# Patient Record
Sex: MALE | Race: AMERICAN INDIAN OR ALASKA NATIVE | NOT HISPANIC OR LATINO | Employment: OTHER | URBAN - METROPOLITAN AREA
[De-identification: names, ages, dates, MRNs, and addresses within clinical notes are randomized per-mention and may not be internally consistent; named-entity substitution may affect disease eponyms.]

---

## 2017-01-10 ENCOUNTER — OFFICE VISIT (OUTPATIENT)
Dept: PODIATRY | Facility: CLINIC | Age: 67
End: 2017-01-10
Payer: MEDICARE

## 2017-01-10 VITALS
HEIGHT: 78 IN | DIASTOLIC BLOOD PRESSURE: 82 MMHG | HEART RATE: 59 BPM | WEIGHT: 315 LBS | SYSTOLIC BLOOD PRESSURE: 167 MMHG | BODY MASS INDEX: 36.45 KG/M2

## 2017-01-10 DIAGNOSIS — B35.1 ONYCHOMYCOSIS: ICD-10-CM

## 2017-01-10 DIAGNOSIS — E11.42 TYPE 2 DIABETES MELLITUS WITH PERIPHERAL NEUROPATHY: Primary | ICD-10-CM

## 2017-01-10 DIAGNOSIS — B35.3 TINEA PEDIS OF BOTH FEET: ICD-10-CM

## 2017-01-10 DIAGNOSIS — I87.2 VENOUS INSUFFICIENCY OF BOTH LOWER EXTREMITIES: ICD-10-CM

## 2017-01-10 PROCEDURE — 4010F ACE/ARB THERAPY RXD/TAKEN: CPT | Mod: S$GLB,,, | Performed by: PODIATRIST

## 2017-01-10 PROCEDURE — 11721 DEBRIDE NAIL 6 OR MORE: CPT | Mod: Q9,S$GLB,, | Performed by: PODIATRIST

## 2017-01-10 PROCEDURE — 99212 OFFICE O/P EST SF 10 MIN: CPT | Mod: 25,S$GLB,, | Performed by: PODIATRIST

## 2017-01-10 PROCEDURE — 1159F MED LIST DOCD IN RCRD: CPT | Mod: S$GLB,,, | Performed by: PODIATRIST

## 2017-01-10 PROCEDURE — 3079F DIAST BP 80-89 MM HG: CPT | Mod: S$GLB,,, | Performed by: PODIATRIST

## 2017-01-10 PROCEDURE — 99999 PR PBB SHADOW E&M-EST. PATIENT-LVL III: CPT | Mod: PBBFAC,,, | Performed by: PODIATRIST

## 2017-01-10 PROCEDURE — 3077F SYST BP >= 140 MM HG: CPT | Mod: S$GLB,,, | Performed by: PODIATRIST

## 2017-01-10 PROCEDURE — 3045F PR MOST RECENT HEMOGLOBIN A1C LEVEL 7.0-9.0%: CPT | Mod: S$GLB,,, | Performed by: PODIATRIST

## 2017-01-10 PROCEDURE — 2022F DILAT RTA XM EVC RTNOPTHY: CPT | Mod: S$GLB,,, | Performed by: PODIATRIST

## 2017-01-10 RX ORDER — KETOCONAZOLE 20 MG/G
CREAM TOPICAL 2 TIMES DAILY
Qty: 60 G | Refills: 5 | Status: SHIPPED | OUTPATIENT
Start: 2017-01-10 | End: 2017-07-29 | Stop reason: SDUPTHER

## 2017-01-10 NOTE — MR AVS SNAPSHOT
[facility]   [place]  [place] [address]  Phone: [phone]                  Titi Banks   1/10/2017 10:15 AM   Office Visit    Description:  Male : 1950   Provider:  Bert Lazar DPM   Department:  [facility]           Reason for Visit     Diabetic Foot Exam           Diagnoses this Visit        Comments    Type 2 diabetes mellitus with peripheral neuropathy    -  Primary     Venous insufficiency of both lower extremities         Onychomycosis         Tinea pedis of both feet                To Do List           Future Appointments        Provider Department Dept Phone    2017 9:00 AM [facility] [phone]    4/10/2017 10:00 AM [facility] [phone]      Goals (5 Years of Data)     None      Follow-Up and Disposition     Return in about 3 months (around 4/10/2017).       These Medications        Disp Refills Start End    ketoconazole (NIZORAL) 2 % cream 60 g 5 1/10/2017     Apply topically 2 (two) times daily. Apply to feet. - Topical (Top)    Pharmacy: [facility]/pharmacy #[id number] - [place] - [address]  #: [phone]         [facility]     [facility] Care Line -  Assistance  Registered nurses in the [facility] provide clinical advisement, health education, appointment booking, and other advisory services.  Call for this free service at [phone].             Medications           START taking these NEW medications        Refills    ketoconazole (NIZORAL) 2 % cream 5    Sig: Apply topically 2 (two) times daily. Apply to feet.    Class: Normal    Route: Topical (Top)           Verify that the below list of medications is an accurate representation of the medications you are currently taking.  If none reported, the list may be blank. If incorrect, please contact your healthcare provider. Carry this list with you in case of emergency.          "  Current Medications     b complex vitamins tablet Take 1 tablet by mouth once daily.    CA CARB/D3/MAG OX//KAYLENE/ZN (CALTRATE + D3 PLUS MINERALS ORAL) Take 1 tablet by mouth once daily.    glimepiride (AMARYL) 2 MG tablet Take 2 mg by mouth once daily.     glucosamine-chondroitin 500-400 mg tablet Take 1 tablet by mouth once daily.    Lactobacillus rhamnosus GG (CULTURELLE) 10 billion cell capsule Take 1 capsule by mouth once daily.    losartan (COZAAR) 25 MG tablet Take 25 mg by mouth once daily.     metformin (GLUCOPHAGE-XR) 500 MG 24 hr tablet Pt. May Take 2 tablets by mouth in the morning, and 2 tablets by mouth in the evening or pt. Can take one tablet by mouth four times a day.    multivitamin capsule Take 1 capsule by mouth once daily.    multivitamin-minerals-lutein Tab Take 1 tablet by mouth once daily.    pravastatin (PRAVACHOL) 40 MG tablet Take 40 mg by mouth every evening.     ketoconazole (NIZORAL) 2 % cream Apply topically 2 (two) times daily. Apply to feet.           Clinical Reference Information           Vital Signs - Last Recorded  Most recent update: 1/10/2017 11:13 AM by Reena Mack MA    BP Pulse Ht Wt BMI    (!) 167/82 (!) 59 6' 9" (2.057 m) (!) 213.2 kg (470 lb) 50.37 kg/m2      Blood Pressure          Most Recent Value    BP  (!)  167/82      Allergies as of 1/10/2017     Aspartame    Avandia [Rosiglitazone]      Immunizations Administered on Date of Encounter - 1/10/2017     None      Orders Placed During Today's Visit     Future Labs/Procedures Expected by Expires    US Lower Extremity Arteries Bilateral  1/10/2017 1/10/2018      "

## 2017-01-11 NOTE — PROGRESS NOTES
"Subjective:      Patient ID: Titi Banks is a 66 y.o. male.    Chief Complaint: Diabetic Foot Exam    Titi is a 66 y.o. male who presents to the clinic for evaluation and treatment of high risk feet. Titi has a past medical history of Arthritis; Cataract; Diabetes mellitus, type 2; Hyperlipidemia; Hypertension; PAD (peripheral artery disease); and Seizures. Complains of intermittent itching to both feet.    PCP: JUDY Jennings  Date Last Seen by PCP:     Current shoe gear:  Affected Foot: Casual shoes     Unaffected Foot: Casual shoes    Hemoglobin A1C   Date Value Ref Range Status   02/12/2016 7.2 (H) 4.5 - 6.2 % Final   02/12/2016 7.2 (H) 4.5 - 6.2 % Final     Vitals:    01/10/17 1112   BP: (!) 167/82   Pulse: (!) 59   Weight: (!) 213.2 kg (470 lb)   Height: 6' 9" (2.057 m)   PainSc: 0-No pain      Past Medical History   Diagnosis Date    Arthritis     Cataract     Diabetes mellitus, type 2     Hyperlipidemia     Hypertension     PAD (peripheral artery disease)     Seizures        Past Surgical History   Procedure Laterality Date    Knee arthroscopy 10+ yrs ago Left 10+ years ago    Eye surgery         Family History   Problem Relation Age of Onset    Hypertension Mother     Heart disease Mother     Cancer Father     Heart disease Sister     Diabetes Brother     Hypertension Brother     Heart disease Brother        Social History     Social History    Marital status:      Spouse name: N/A    Number of children: N/A    Years of education: N/A     Social History Main Topics    Smoking status: Never Smoker    Smokeless tobacco: None    Alcohol use No    Drug use: No    Sexual activity: Not Asked     Other Topics Concern    None     Social History Narrative       Current Outpatient Prescriptions   Medication Sig Dispense Refill    b complex vitamins tablet Take 1 tablet by mouth once daily.      CA CARB/D3/MAG OX//KAYLENE/ZN (CALTRATE + D3 PLUS MINERALS ORAL) Take 1 tablet " by mouth once daily.      glimepiride (AMARYL) 2 MG tablet Take 2 mg by mouth once daily.       glucosamine-chondroitin 500-400 mg tablet Take 1 tablet by mouth once daily.      Lactobacillus rhamnosus GG (CULTURELLE) 10 billion cell capsule Take 1 capsule by mouth once daily.      losartan (COZAAR) 25 MG tablet Take 25 mg by mouth once daily.       metformin (GLUCOPHAGE-XR) 500 MG 24 hr tablet Pt. May Take 2 tablets by mouth in the morning, and 2 tablets by mouth in the evening or pt. Can take one tablet by mouth four times a day.      multivitamin capsule Take 1 capsule by mouth once daily.      multivitamin-minerals-lutein Tab Take 1 tablet by mouth once daily.      pravastatin (PRAVACHOL) 40 MG tablet Take 40 mg by mouth every evening.       ketoconazole (NIZORAL) 2 % cream Apply topically 2 (two) times daily. Apply to feet. 60 g 5     No current facility-administered medications for this visit.        Review of patient's allergies indicates:   Allergen Reactions    Aspartame     Avandia [rosiglitazone]        Review of Systems   Constitution: Negative for chills and fever.   Cardiovascular: Positive for leg swelling. Negative for chest pain and claudication.   Respiratory: Negative for cough and shortness of breath.    Skin: Positive for dry skin, nail changes and poor wound healing.   Musculoskeletal: Negative.    Gastrointestinal: Negative for nausea and vomiting.   Neurological: Positive for numbness. Negative for paresthesias.   Psychiatric/Behavioral: Negative for altered mental status.           Objective:      Physical Exam   Constitutional: He is oriented to person, place, and time. No distress.   HENT:   Head: Normocephalic.   Cardiovascular: Intact distal pulses.    Pulses:       Dorsalis pedis pulses are 2+ on the right side, and 2+ on the left side.        Posterior tibial pulses are 1+ on the right side, and 1+ on the left side.   CFT< 3 secs all toes bilateral foot, skin temp warm  bilateral foot, no digital hair growth bilateral foot, moderate pitting lower extremity edema with hemosiderin staining and rubor on dependency bilateral.         Musculoskeletal:   Equinus noted b/l ankles. Hammertoe contractures noted to toes 2-3 b/l, semi-reducible. No pain to palpation b/l foot or toes. Cavus foot type bilateral.     Feet:   Right Foot:   Protective Sensation: 10 sites tested. 0 sites sensed.   Skin Integrity: Negative for ulcer, blister, skin breakdown, erythema, warmth, callus or dry skin.   Left Foot:   Protective Sensation: 10 sites tested. 0 sites sensed.   Skin Integrity: Positive for callus. Negative for ulcer (distal tip 3rd toe), blister (3rd toe) or erythema (3rd toe).   Neurological: He is alert and oriented to person, place, and time. He has normal strength. A sensory deficit is present.   Light touch, proprioception, and sharp/dull sensation are all intact bilaterally. Diminished vibratory sensation b/l.    Skin: Skin is warm, dry and intact. No ecchymosis and no rash noted. He is not diaphoretic. No cyanosis or erythema. Nails show no clubbing.   Nails 1-5 bilateral are elongated, dystrophic, brittle with moderate debris and yellow discoloration.     Skin is dry and scaly involving plantar foot and interdigital areas bilateral foot.    No open lesions or macerations bilateral lower extremity.           Vitals reviewed.        Assessment:       Encounter Diagnoses   Name Primary?    Type 2 diabetes mellitus with peripheral neuropathy Yes    Venous insufficiency of both lower extremities     Onychomycosis     Tinea pedis of both feet          Plan:       Titi was seen today for diabetic foot exam.    Diagnoses and all orders for this visit:    Type 2 diabetes mellitus with peripheral neuropathy  -     US Lower Extremity Arteries Bilateral; Future  -     ketoconazole (NIZORAL) 2 % cream; Apply topically 2 (two) times daily. Apply to feet.    Venous insufficiency of both lower  extremities  -     US Lower Extremity Arteries Bilateral; Future  -     ketoconazole (NIZORAL) 2 % cream; Apply topically 2 (two) times daily. Apply to feet.    Onychomycosis    Tinea pedis of both feet      I counseled the patient on his conditions, their implications and medical management.    Discussed importance of tight glycemic control, daily foot checks, routine emolient therapy and wearing appropriate foot wear.    With patient's verbal consent, nails were aggressively reduced and debrided x 10 to their soft tissue attachment mechanically and with electric , removing all offending nail and debris. Patient relates relief following the procedure. No anesthesia or hemostasis required. No blood loss.     Discussed treating contact surfaces and shoes with Lysol and exposing to direct sun light.    Discussed proper foot and hand hygiene.    RTC 3 months or prn.

## 2017-02-04 ENCOUNTER — HOSPITAL ENCOUNTER (OUTPATIENT)
Dept: RADIOLOGY | Facility: HOSPITAL | Age: 67
Discharge: HOME | End: 2017-02-04
Attending: PODIATRIST
Payer: MEDICARE

## 2017-02-04 DIAGNOSIS — E11.42 TYPE 2 DIABETES MELLITUS WITH PERIPHERAL NEUROPATHY: ICD-10-CM

## 2017-02-04 DIAGNOSIS — I87.2 VENOUS INSUFFICIENCY OF BOTH LOWER EXTREMITIES: ICD-10-CM

## 2017-02-04 PROCEDURE — 93925 LOWER EXTREMITY STUDY: CPT | Mod: TC

## 2017-02-04 PROCEDURE — 93925 LOWER EXTREMITY STUDY: CPT | Mod: 26,,, | Performed by: RADIOLOGY

## 2017-02-13 ENCOUNTER — TELEPHONE (OUTPATIENT)
Dept: PODIATRY | Facility: CLINIC | Age: 67
End: 2017-02-13

## 2017-02-13 ENCOUNTER — PATIENT MESSAGE (OUTPATIENT)
Dept: PODIATRY | Facility: CLINIC | Age: 67
End: 2017-02-13

## 2017-02-13 DIAGNOSIS — E11.51 TYPE 2 DIABETES MELLITUS WITH PERIPHERAL CIRCULATORY DISORDER: Primary | ICD-10-CM

## 2017-02-13 NOTE — TELEPHONE ENCOUNTER
Please notify patient that he has 2 arteries going down to his left foot that have restricted blood flow. I am referring him to Cardiology at [facility] for further evaluation.

## 2017-03-03 ENCOUNTER — OFFICE VISIT (OUTPATIENT)
Dept: CARDIOLOGY | Facility: CLINIC | Age: 67
End: 2017-03-03
Payer: MEDICARE

## 2017-03-03 VITALS
HEART RATE: 90 BPM | BODY MASS INDEX: 42.66 KG/M2 | WEIGHT: 315 LBS | HEIGHT: 72 IN | SYSTOLIC BLOOD PRESSURE: 170 MMHG | DIASTOLIC BLOOD PRESSURE: 90 MMHG

## 2017-03-03 DIAGNOSIS — I73.9 PAD (PERIPHERAL ARTERY DISEASE): Primary | ICD-10-CM

## 2017-03-03 DIAGNOSIS — G47.33 OSA ON CPAP: ICD-10-CM

## 2017-03-03 DIAGNOSIS — M86.9 OSTEOMYELITIS OF FOOT, UNSPECIFIED CHRONICITY, UNSPECIFIED LATERALITY: ICD-10-CM

## 2017-03-03 DIAGNOSIS — E66.01 MORBID OBESITY, UNSPECIFIED OBESITY TYPE: Chronic | ICD-10-CM

## 2017-03-03 DIAGNOSIS — E78.5 HYPERLIPIDEMIA, UNSPECIFIED HYPERLIPIDEMIA TYPE: ICD-10-CM

## 2017-03-03 DIAGNOSIS — I10 ESSENTIAL HYPERTENSION: Chronic | ICD-10-CM

## 2017-03-03 PROCEDURE — 1160F RVW MEDS BY RX/DR IN RCRD: CPT | Mod: S$GLB,,, | Performed by: INTERNAL MEDICINE

## 2017-03-03 PROCEDURE — 3080F DIAST BP >= 90 MM HG: CPT | Mod: S$GLB,,, | Performed by: INTERNAL MEDICINE

## 2017-03-03 PROCEDURE — 99999 PR PBB SHADOW E&M-EST. PATIENT-LVL III: CPT | Mod: PBBFAC,,, | Performed by: INTERNAL MEDICINE

## 2017-03-03 PROCEDURE — 99204 OFFICE O/P NEW MOD 45 MIN: CPT | Mod: S$GLB,,, | Performed by: INTERNAL MEDICINE

## 2017-03-03 PROCEDURE — 3077F SYST BP >= 140 MM HG: CPT | Mod: S$GLB,,, | Performed by: INTERNAL MEDICINE

## 2017-03-03 NOTE — LETTER
March 3, 2017      Bert Lazar, DPM  [address] [address]           [facility]  [address], [address]  [facility] [address]  Phone: [phone]          Patient: Titi Banks   MR Number: [id number]   YOB: 1950   Date of Visit: 3/3/2017       Dear Dr. Bert Lazar:    Thank you for referring Titi Banks to me for evaluation. Attached you will find relevant portions of my assessment and plan of care.    If you have questions, please do not hesitate to call me. I look forward to following Titi Banks along with you.    Sincerely,    Jasen Manuel MD    Enclosure  CC:  No Recipients    If you would like to receive this communication electronically, please contact [email or web address] or [phone] to request more information on [facility] Link access.    For providers and/or their staff who would like to refer a patient to [facility], please contact us through our one-stop-shop provider referral line, [facility] , at [phone].    If you feel you have received this communication in error or would no longer like to receive these types of communications, please e-mail [email or web address]

## 2017-03-04 NOTE — PROGRESS NOTES
"Subjective:    Patient ID:  Titi Banks is a 66 y.o. male who presents for evaluation of Peripheral Arterial Disease      HPI  65 y/o male referred by Dr. Lazar for evaluation/management of PAD. He has a hx of morbid obesity (470 lbs), HTN, HLD, DM, NYASIA on CPAP. Had prolonged healing of left second toe which has healed appropriately. Had LE arterial doppler which suggests significant stenosis of LAT and LPT. No non healing ulceration or suspicious lesions noted. It is hard to determine if he has claudication since he doesn't walk much. He states that his right thigh sometimes "gets tired" when he walks, but mostly both legs "get weak" when he walks. Denies CP, orthopnea, PND, syncope, palps. Intermittent VELEZ. Does not smoke, but does have a family hx of CAD.     Review of Systems   Constitution: Positive for weakness and malaise/fatigue.   HENT: Negative for congestion and headaches.    Eyes: Negative for blurred vision.   Cardiovascular: Positive for dyspnea on exertion and leg swelling. Negative for chest pain, claudication, cyanosis, irregular heartbeat, near-syncope, orthopnea, palpitations, paroxysmal nocturnal dyspnea and syncope.   Respiratory: Negative for shortness of breath.    Endocrine: Negative for polyuria.   Hematologic/Lymphatic: Negative for bleeding problem.   Skin: Positive for color change, dry skin and poor wound healing. Negative for itching and rash.   Musculoskeletal: Positive for back pain, joint swelling and muscle weakness. Negative for muscle cramps.   Gastrointestinal: Negative for abdominal pain, hematemesis, hematochezia, melena, nausea and vomiting.   Genitourinary: Negative for dysuria and hematuria.   Neurological: Negative for dizziness, focal weakness, light-headedness and loss of balance.   Psychiatric/Behavioral: Negative for depression. The patient is not nervous/anxious.         Objective:    Physical Exam   Constitutional: He is oriented to person, place, and time. He " appears well-developed and well-nourished.   obese   HENT:   Head: Normocephalic and atraumatic.   Neck: Neck supple. No JVD present.   Cardiovascular: Normal rate, regular rhythm and normal heart sounds.    Pulses:       Carotid pulses are 2+ on the right side, and 2+ on the left side.       Radial pulses are 2+ on the right side, and 2+ on the left side.        Femoral pulses are 2+ on the right side, and 2+ on the left side.       Dorsalis pedis pulses are 2+ on the right side, and 1+ on the left side.        Posterior tibial pulses are 2+ on the right side, and 1+ on the left side.   LPT and LDP with strong biphasic doppler.  RPT and RDP with strong triphasic doppler.   Pulmonary/Chest: Effort normal and breath sounds normal.   Abdominal: Soft. Bowel sounds are normal.   Musculoskeletal: He exhibits no edema.   Neurological: He is alert and oriented to person, place, and time.   Skin: Skin is warm and dry.   Feet with toe erythema.  Left 2nd toe with almost completely healed lesion.   Psychiatric: He has a normal mood and affect. His behavior is normal. Thought content normal.         Assessment:       1. PAD (peripheral artery disease)    2. Essential hypertension    3. Osteomyelitis of foot, unspecified chronicity, unspecified laterality    4. Morbid obesity, unspecified obesity type    5. Hyperlipidemia, unspecified hyperlipidemia type      67 y/o male with hx and presentation as above. Arterial LE doppler reviewed and patient examined. Strong distal pulses bilaterally and left toe ulceration healing well. Will add ASA 81 mg to regimen and already on statin. Medical management of PAD for now as no evidence of CLI or non healing ulceration. Needs to lose weight, we discussed this along with heart healthy diet, med compliance and regular exercise. Currently too heavy for cath lab table for peripheral angiogram and we discussed the need for weight loss for overall health. We discussed this and that if he were to  develop symptoms of claudication, would continue medical therapy with cilostazol. Was counselled to initiate walking program. Bilateral arterial doppler prior to next visit.       Plan:       -Start ASA 81 mg daily and continue statin  -If claudication develops - cilostazol   -Weight loss  -Walking program   -f/u in 6 months with arterial LE doppler

## 2017-03-06 ENCOUNTER — TELEPHONE (OUTPATIENT)
Dept: CARDIOLOGY | Facility: CLINIC | Age: 67
End: 2017-03-06

## 2017-03-06 NOTE — TELEPHONE ENCOUNTER
----- Message from Jasen Manuel MD sent at 3/3/2017  8:01 PM CST -----  Please make sure Mr Banks gets an arterial LE doppler right before his next clinic visit in 6 months, not sooner  Thanks  ROBERT

## 2017-04-10 ENCOUNTER — OFFICE VISIT (OUTPATIENT)
Dept: PODIATRY | Facility: CLINIC | Age: 67
End: 2017-04-10
Payer: MEDICARE

## 2017-04-10 VITALS
WEIGHT: 315 LBS | HEIGHT: 72 IN | SYSTOLIC BLOOD PRESSURE: 137 MMHG | DIASTOLIC BLOOD PRESSURE: 79 MMHG | HEART RATE: 85 BPM | BODY MASS INDEX: 42.66 KG/M2

## 2017-04-10 DIAGNOSIS — E11.42 TYPE 2 DIABETES MELLITUS WITH PERIPHERAL NEUROPATHY: Primary | ICD-10-CM

## 2017-04-10 DIAGNOSIS — B35.1 ONYCHOMYCOSIS: ICD-10-CM

## 2017-04-10 DIAGNOSIS — I87.2 VENOUS INSUFFICIENCY OF BOTH LOWER EXTREMITIES: ICD-10-CM

## 2017-04-10 PROCEDURE — 99999 PR PBB SHADOW E&M-EST. PATIENT-LVL III: CPT | Mod: PBBFAC,,, | Performed by: PODIATRIST

## 2017-04-10 PROCEDURE — 99499 UNLISTED E&M SERVICE: CPT | Mod: S$GLB,,, | Performed by: PODIATRIST

## 2017-04-10 PROCEDURE — 11721 DEBRIDE NAIL 6 OR MORE: CPT | Mod: Q9,S$GLB,, | Performed by: PODIATRIST

## 2017-04-10 RX ORDER — ASPIRIN 81 MG/1
81 TABLET ORAL DAILY
COMMUNITY

## 2017-04-10 NOTE — PROGRESS NOTES
Subjective:      Patient ID: Titi Banks is a 66 y.o. male.    Chief Complaint: Diabetic Foot Exam    Titi is a 66 y.o. male who presents to the clinic for evaluation and treatment of high risk feet. Titi has a past medical history of Arthritis; Cataract; Diabetes mellitus, type 2; Hyperlipidemia; Hypertension; PAD (peripheral artery disease); and Seizures. No new complaints.    PCP: JUDY Jennings  Date Last Seen by PCP: 6 months ago    Current shoe gear:  Affected Foot: Casual shoes     Unaffected Foot: Casual shoes    Hemoglobin A1C   Date Value Ref Range Status   02/12/2016 7.2 (H) 4.5 - 6.2 % Final   02/12/2016 7.2 (H) 4.5 - 6.2 % Final     Vitals:    04/10/17 1004   BP: 137/79   Pulse: 85   Weight: (!) 213.2 kg (470 lb)   Height: 6' (1.829 m)   PainSc: 0-No pain      Past Medical History:   Diagnosis Date    Arthritis     Cataract     Diabetes mellitus, type 2     Hyperlipidemia     Hypertension     PAD (peripheral artery disease)     Seizures        Past Surgical History:   Procedure Laterality Date    EYE SURGERY      knee arthroscopy 10+ yrs ago Left 10+ years ago       Family History   Problem Relation Age of Onset    Hypertension Mother     Heart disease Mother     Cancer Father     Heart disease Sister     Diabetes Brother     Hypertension Brother     Heart disease Brother        Social History     Social History    Marital status:      Spouse name: N/A    Number of children: N/A    Years of education: N/A     Social History Main Topics    Smoking status: Never Smoker    Smokeless tobacco: None    Alcohol use No    Drug use: No    Sexual activity: Not Asked     Other Topics Concern    None     Social History Narrative       Current Outpatient Prescriptions   Medication Sig Dispense Refill    aspirin (ECOTRIN) 81 MG EC tablet Take 81 mg by mouth once daily.      b complex vitamins tablet Take 1 tablet by mouth once daily.      CA CARB/D3/MAG OX//KAYLENE/ZN  (CALTRATE + D3 PLUS MINERALS ORAL) Take 1 tablet by mouth once daily.      glimepiride (AMARYL) 2 MG tablet Take 2 mg by mouth once daily.       glucosamine-chondroitin 500-400 mg tablet Take 1 tablet by mouth once daily.      ketoconazole (NIZORAL) 2 % cream Apply topically 2 (two) times daily. Apply to feet. 60 g 5    Lactobacillus rhamnosus GG (CULTURELLE) 10 billion cell capsule Take 1 capsule by mouth once daily.      losartan (COZAAR) 25 MG tablet Take 25 mg by mouth once daily.       metformin (GLUCOPHAGE-XR) 500 MG 24 hr tablet Pt. May Take 2 tablets by mouth in the morning, and 2 tablets by mouth in the evening or pt. Can take one tablet by mouth four times a day.      multivitamin capsule Take 1 capsule by mouth once daily.      multivitamin-minerals-lutein Tab Take 1 tablet by mouth once daily.      pravastatin (PRAVACHOL) 40 MG tablet Take 40 mg by mouth every evening.        No current facility-administered medications for this visit.        Review of patient's allergies indicates:   Allergen Reactions    Aspartame     Avandia [rosiglitazone]        Review of Systems   Constitution: Negative for chills and fever.   Cardiovascular: Positive for leg swelling. Negative for chest pain and claudication.   Respiratory: Negative for cough and shortness of breath.    Skin: Positive for dry skin, nail changes and poor wound healing.   Musculoskeletal: Negative.    Gastrointestinal: Negative for nausea and vomiting.   Neurological: Positive for numbness. Negative for paresthesias.   Psychiatric/Behavioral: Negative for altered mental status.           Objective:      Physical Exam   Constitutional: He is oriented to person, place, and time. No distress.   HENT:   Head: Normocephalic.   Cardiovascular: Intact distal pulses.    Pulses:       Dorsalis pedis pulses are 2+ on the right side, and 2+ on the left side.        Posterior tibial pulses are 1+ on the right side, and 1+ on the left side.   CFT< 3  secs all toes bilateral foot, skin temp warm bilateral foot, no digital hair growth bilateral foot, moderate pitting lower extremity edema with hemosiderin staining and rubor on dependency bilateral.         Musculoskeletal:   Equinus noted b/l ankles. Hammertoe contractures noted to toes 2-3 b/l, semi-reducible. No pain to palpation b/l foot or toes. Cavus foot type bilateral.     Feet:   Right Foot:   Protective Sensation: 10 sites tested. 0 sites sensed.   Skin Integrity: Negative for ulcer, blister, skin breakdown, erythema, warmth, callus or dry skin.   Left Foot:   Protective Sensation: 10 sites tested. 0 sites sensed.   Skin Integrity: Positive for callus. Negative for ulcer (distal tip 3rd toe), blister (3rd toe) or erythema (3rd toe).   Neurological: He is alert and oriented to person, place, and time. He has normal strength. A sensory deficit is present.   Light touch, proprioception, and sharp/dull sensation are all intact bilaterally. Diminished vibratory sensation b/l.    Skin: Skin is warm, dry and intact. No ecchymosis and no rash noted. He is not diaphoretic. No cyanosis or erythema. Nails show no clubbing.   Nails 1-5 bilateral are elongated, dystrophic, brittle with moderate debris and yellow discoloration.     Skin is dry bilateral foot.    No open lesions or macerations bilateral lower extremity.           Vitals reviewed.        Assessment:       Encounter Diagnoses   Name Primary?    Type 2 diabetes mellitus with peripheral neuropathy Yes    Venous insufficiency of both lower extremities     Onychomycosis          Plan:       Titi was seen today for diabetic foot exam.    Diagnoses and all orders for this visit:    Type 2 diabetes mellitus with peripheral neuropathy    Venous insufficiency of both lower extremities    Onychomycosis    I counseled the patient on his conditions, their implications and medical management.    Discussed importance of tight glycemic control, daily foot checks,  routine emolient therapy and wearing appropriate foot wear.    With patient's verbal consent, nails were aggressively reduced and debrided x 10 to their soft tissue attachment mechanically and with electric , removing all offending nail and debris. Patient relates relief following the procedure. No anesthesia or hemostasis required. No blood loss.     RTC 3 months or prn.

## 2017-04-10 NOTE — MR AVS SNAPSHOT
[facility]   [place] [address]  Phone: [phone]                  Titi Banks   4/10/2017 10:00 AM   Office Visit    Description:  Male : 1950   Provider:  Bert Lazar DPM   Department:  [facility]           Reason for Visit     Diabetic Foot Exam                To Do List           Future Appointments        Provider Department Dept Phone    2017 9:45 AM Bert Lazar DPM [facility] [phone]      Goals (5 Years of Data)     None      Follow-Up and Disposition     Return in about 3 months (around 7/10/2017).      [facility]     [facility] On Call Nurse Care Line -  Assistance  Unless otherwise directed by your provider, please contact [facility], our nurse care line that is available for  assistance.     Registered nurses in the [facility] On Call Center provide: appointment scheduling, clinical advisement, health education, and other advisory services.  Call: [phone] (toll free)               Medications                Verify that the below list of medications is an accurate representation of the medications you are currently taking.  If none reported, the list may be blank. If incorrect, please contact your healthcare provider. Carry this list with you in case of emergency.           Current Medications     aspirin (ECOTRIN) 81 MG EC tablet Take 81 mg by mouth once daily.    b complex vitamins tablet Take 1 tablet by mouth once daily.    CA CARB/D3/MAG OX//KAYLENE/ZN (CALTRATE + D3 PLUS MINERALS ORAL) Take 1 tablet by mouth once daily.    glimepiride (AMARYL) 2 MG tablet Take 2 mg by mouth once daily.     glucosamine-chondroitin 500-400 mg tablet Take 1 tablet by mouth once daily.    ketoconazole (NIZORAL) 2 % cream Apply topically 2 (two) times daily. Apply to feet.    Lactobacillus rhamnosus GG (CULTURELLE) 10 billion cell capsule Take 1 capsule by mouth once daily.    losartan (COZAAR) 25 MG tablet  Take 25 mg by mouth once daily.     metformin (GLUCOPHAGE-XR) 500 MG 24 hr tablet Pt. May Take 2 tablets by mouth in the morning, and 2 tablets by mouth in the evening or pt. Can take one tablet by mouth four times a day.    multivitamin capsule Take 1 capsule by mouth once daily.    multivitamin-minerals-lutein Tab Take 1 tablet by mouth once daily.    pravastatin (PRAVACHOL) 40 MG tablet Take 40 mg by mouth every evening.            Clinical Reference Information           Your Vitals Were     BP Pulse Height Weight BMI    137/79 85 6' (1.829 m) 213.2 kg (470 lb) 63.74 kg/m2      Blood Pressure          Most Recent Value    BP  137/79      Allergies as of 4/10/2017     Aspartame    Avandia [Rosiglitazone]      Immunizations Administered on Date of Encounter - 4/10/2017     None      Language Assistance Services     ATTENTION: Language assistance services are available, free of charge. Please call [phone].      ATENCIÓN: Si habla español, tiene a wray disposición servicios gratuitos de asistencia lingüística. Aliyah al [phone].     JOSE R Ý: N?u b?n nói Ti?ng Vi?t, có các d?ch v? h? tr? ngôn ng? mi?n phí dành cho b?n. G?i s? [phone].         [facility] complies with applicable Federal civil rights laws and does not discriminate on the basis of race, color, national origin, age, disability, or sex.

## 2017-07-11 ENCOUNTER — OFFICE VISIT (OUTPATIENT)
Dept: PODIATRY | Facility: CLINIC | Age: 67
End: 2017-07-11
Payer: MEDICARE

## 2017-07-11 VITALS
HEART RATE: 73 BPM | WEIGHT: 315 LBS | HEIGHT: 72 IN | SYSTOLIC BLOOD PRESSURE: 139 MMHG | DIASTOLIC BLOOD PRESSURE: 82 MMHG | BODY MASS INDEX: 42.66 KG/M2

## 2017-07-11 DIAGNOSIS — E11.42 TYPE 2 DIABETES MELLITUS WITH PERIPHERAL NEUROPATHY: Primary | ICD-10-CM

## 2017-07-11 DIAGNOSIS — E11.51 TYPE 2 DIABETES MELLITUS WITH PERIPHERAL CIRCULATORY DISORDER: ICD-10-CM

## 2017-07-11 DIAGNOSIS — B35.1 ONYCHOMYCOSIS: ICD-10-CM

## 2017-07-11 PROCEDURE — 99999 PR PBB SHADOW E&M-EST. PATIENT-LVL III: CPT | Mod: PBBFAC,,, | Performed by: PODIATRIST

## 2017-07-11 PROCEDURE — 99499 UNLISTED E&M SERVICE: CPT | Mod: S$GLB,,, | Performed by: PODIATRIST

## 2017-07-11 PROCEDURE — 11721 DEBRIDE NAIL 6 OR MORE: CPT | Mod: Q9,S$GLB,, | Performed by: PODIATRIST

## 2017-07-12 NOTE — PROGRESS NOTES
Subjective:      Patient ID: Titi Banks is a 67 y.o. male.    Chief Complaint: Diabetic Foot Exam (PCP 7/21/16)    Titi is a 67 y.o. male who presents to the clinic for evaluation and treatment of high risk feet. Titi has a past medical history of Arthritis; Cataract; Diabetes mellitus, type 2; Hyperlipidemia; Hypertension; PAD (peripheral artery disease); and Seizures. No new complaints.    PCP: JUDY Jennings  Date Last Seen by PCP:     Current shoe gear:  Affected Foot: Casual shoes     Unaffected Foot: Casual shoes    Hemoglobin A1C   Date Value Ref Range Status   02/12/2016 7.2 (H) 4.5 - 6.2 % Final   02/12/2016 7.2 (H) 4.5 - 6.2 % Final     Vitals:    07/11/17 1002   BP: 139/82   Pulse: 73   Weight: (!) 213.2 kg (470 lb)   Height: 6' (1.829 m)   PainSc: 0-No pain      Past Medical History:   Diagnosis Date    Arthritis     Cataract     Diabetes mellitus, type 2     Hyperlipidemia     Hypertension     PAD (peripheral artery disease)     Seizures        Past Surgical History:   Procedure Laterality Date    EYE SURGERY      knee arthroscopy 10+ yrs ago Left 10+ years ago       Family History   Problem Relation Age of Onset    Hypertension Mother     Heart disease Mother     Cancer Father     Heart disease Sister     Diabetes Brother     Hypertension Brother     Heart disease Brother        Social History     Social History    Marital status:      Spouse name: N/A    Number of children: N/A    Years of education: N/A     Social History Main Topics    Smoking status: Never Smoker    Smokeless tobacco: None    Alcohol use No    Drug use: No    Sexual activity: Not Asked     Other Topics Concern    None     Social History Narrative    None       Current Outpatient Prescriptions   Medication Sig Dispense Refill    aspirin (ECOTRIN) 81 MG EC tablet Take 81 mg by mouth once daily.      b complex vitamins tablet Take 1 tablet by mouth once daily.      CA CARB/D3/MAG  OX//KAYLENE/ZN (CALTRATE + D3 PLUS MINERALS ORAL) Take 1 tablet by mouth once daily.      glimepiride (AMARYL) 2 MG tablet Take 2 mg by mouth once daily.       glucosamine-chondroitin 500-400 mg tablet Take 1 tablet by mouth once daily.      ketoconazole (NIZORAL) 2 % cream Apply topically 2 (two) times daily. Apply to feet. 60 g 5    Lactobacillus rhamnosus GG (CULTURELLE) 10 billion cell capsule Take 1 capsule by mouth once daily.      losartan (COZAAR) 25 MG tablet Take 25 mg by mouth once daily.       metformin (GLUCOPHAGE-XR) 500 MG 24 hr tablet Pt. May Take 2 tablets by mouth in the morning, and 2 tablets by mouth in the evening or pt. Can take one tablet by mouth four times a day.      multivitamin capsule Take 1 capsule by mouth once daily.      multivitamin-minerals-lutein Tab Take 1 tablet by mouth once daily.      pravastatin (PRAVACHOL) 40 MG tablet Take 40 mg by mouth every evening.        No current facility-administered medications for this visit.        Review of patient's allergies indicates:   Allergen Reactions    Aspartame     Avandia [rosiglitazone]        Review of Systems   Constitution: Negative for chills and fever.   Cardiovascular: Positive for leg swelling. Negative for chest pain and claudication.   Respiratory: Negative for cough and shortness of breath.    Skin: Positive for dry skin, nail changes and poor wound healing.   Musculoskeletal: Negative.    Gastrointestinal: Negative for nausea and vomiting.   Neurological: Positive for numbness. Negative for paresthesias.   Psychiatric/Behavioral: Negative for altered mental status.           Objective:      Physical Exam   Constitutional: He is oriented to person, place, and time. No distress.   HENT:   Head: Normocephalic.   Cardiovascular: Intact distal pulses.    Pulses:       Dorsalis pedis pulses are 1+ on the right side, and 1+ on the left side.        Posterior tibial pulses are 1+ on the right side, and 1+ on the left  side.   CFT< 3 secs all toes bilateral foot, skin temp warm to cool bilateral foot, no digital hair growth bilateral lower extremity, moderate pitting lower extremity edema with hemosiderin staining and rubor on dependency bilateral.         Musculoskeletal:   Equinus noted b/l ankles. Hammertoe contractures noted to toes 2-3 b/l, semi-reducible. No pain to palpation b/l foot or toes. Cavus foot type bilateral.     Feet:   Right Foot:   Protective Sensation: 10 sites tested. 0 sites sensed.   Skin Integrity: Positive for dry skin. Negative for ulcer, blister, skin breakdown, erythema, warmth or callus.   Left Foot:   Protective Sensation: 10 sites tested. 0 sites sensed.   Skin Integrity: Positive for dry skin. Negative for ulcer (distal tip 3rd toe), blister (3rd toe), skin breakdown, erythema (3rd toe), warmth or callus.   Neurological: He is alert and oriented to person, place, and time. He has normal strength. A sensory deficit is present.   Diminished vibratory sensation b/l.    Skin: Skin is warm, dry and intact. Capillary refill takes 2 to 3 seconds. No ecchymosis and no rash noted. He is not diaphoretic. No cyanosis or erythema. Nails show no clubbing.   Nails 1-5 bilateral are elongated, dystrophic, brittle with moderate debris and yellow discoloration.     Skin is dry and atrophied bilateral foot.    No open lesions or macerations bilateral lower extremity.           Vitals reviewed.        Assessment:       Encounter Diagnoses   Name Primary?    Type 2 diabetes mellitus with peripheral neuropathy Yes    Type 2 diabetes mellitus with peripheral circulatory disorder     Onychomycosis          Plan:       Titi was seen today for diabetic foot exam.    Diagnoses and all orders for this visit:    Type 2 diabetes mellitus with peripheral neuropathy    Type 2 diabetes mellitus with peripheral circulatory disorder    Onychomycosis      I counseled the patient on his conditions, their implications and medical  management.    Discussed importance of tight glycemic control, daily foot checks, routine emolient therapy and wearing appropriate foot wear.    With patient's verbal consent, nails were aggressively reduced and debrided x 10 to their soft tissue attachment mechanically and with electric , removing all offending nail and debris. Patient relates relief following the procedure. No anesthesia or hemostasis required. No blood loss.     RTC 3 months or prn.

## 2017-07-29 DIAGNOSIS — E11.42 TYPE 2 DIABETES MELLITUS WITH PERIPHERAL NEUROPATHY: ICD-10-CM

## 2017-07-29 DIAGNOSIS — I87.2 VENOUS INSUFFICIENCY OF BOTH LOWER EXTREMITIES: ICD-10-CM

## 2017-07-31 RX ORDER — KETOCONAZOLE 20 MG/G
CREAM TOPICAL
Qty: 60 G | Refills: 5 | Status: SHIPPED | OUTPATIENT
Start: 2017-07-31 | End: 2018-02-26 | Stop reason: SDUPTHER

## 2017-10-07 ENCOUNTER — HOSPITAL ENCOUNTER (OUTPATIENT)
Dept: RADIOLOGY | Facility: HOSPITAL | Age: 67
Discharge: HOME | End: 2017-10-07
Attending: INTERNAL MEDICINE
Payer: MEDICARE

## 2017-10-07 DIAGNOSIS — I73.9 PAD (PERIPHERAL ARTERY DISEASE): ICD-10-CM

## 2017-10-07 PROCEDURE — 93925 LOWER EXTREMITY STUDY: CPT | Mod: 26,,, | Performed by: RADIOLOGY

## 2017-10-07 PROCEDURE — 93925 LOWER EXTREMITY STUDY: CPT | Mod: TC

## 2017-10-10 ENCOUNTER — TELEPHONE (OUTPATIENT)
Dept: CARDIOLOGY | Facility: CLINIC | Age: 67
End: 2017-10-10

## 2017-10-10 NOTE — TELEPHONE ENCOUNTER
----- Message from Jasen Manuel MD sent at 10/10/2017 10:17 AM CDT -----  Pleas let Mr Banks know that his leg ultrasound shows no evidence of severe blockages and good blood flow  Thanks  ROBERT

## 2017-10-17 ENCOUNTER — OFFICE VISIT (OUTPATIENT)
Dept: CARDIOLOGY | Facility: CLINIC | Age: 67
End: 2017-10-17
Payer: MEDICARE

## 2017-10-17 VITALS
OXYGEN SATURATION: 97 % | WEIGHT: 315 LBS | HEIGHT: 72 IN | BODY MASS INDEX: 42.66 KG/M2 | HEART RATE: 74 BPM | SYSTOLIC BLOOD PRESSURE: 136 MMHG | DIASTOLIC BLOOD PRESSURE: 79 MMHG

## 2017-10-17 DIAGNOSIS — I10 ESSENTIAL HYPERTENSION: Chronic | ICD-10-CM

## 2017-10-17 DIAGNOSIS — E11.69 DIABETES MELLITUS TYPE 2 IN OBESE: Chronic | ICD-10-CM

## 2017-10-17 DIAGNOSIS — E66.9 DIABETES MELLITUS TYPE 2 IN OBESE: Chronic | ICD-10-CM

## 2017-10-17 DIAGNOSIS — I73.9 PAD (PERIPHERAL ARTERY DISEASE): Primary | ICD-10-CM

## 2017-10-17 DIAGNOSIS — E78.5 HYPERLIPIDEMIA, UNSPECIFIED HYPERLIPIDEMIA TYPE: ICD-10-CM

## 2017-10-17 PROCEDURE — 99214 OFFICE O/P EST MOD 30 MIN: CPT | Mod: S$GLB,,, | Performed by: INTERNAL MEDICINE

## 2017-10-17 PROCEDURE — 99999 PR PBB SHADOW E&M-EST. PATIENT-LVL III: CPT | Mod: PBBFAC,,, | Performed by: INTERNAL MEDICINE

## 2017-10-17 NOTE — PROGRESS NOTES
"Subjective:    Patient ID:  Titi Banks is a 67 y.o. male who presents for follow-up of Peripheral Arterial Disease      HPI  68 y/o male with hx of PAD s/p prolonged healing of  left second toe which healed appropriately with original arterial doppler of LE's that suggested at least moderate stenosis of LPT and LAT with repeat arterial doppler with no significant stenosis, morbid obesity (455 lbs down from 470 lbs last visit), HTN, HLD, DM, NYASIA on CPAP. No non healing ulceration or suspicious lesions noted. Does not seem to have claudication symptoms, although he does not ambulate much. His "leg pains" are mostly in his knees and ankles. He states that his right thigh sometimes "gets tired" when he walks. Denies CP, orthopnea, PND, syncope, palps. Intermittent VELEZ. Does not smoke, but does have a family hx of CAD. Last clinic visit started on ASA and he is already on a statin. Compliant with meds, but does not follow a heart healthy diet.     Review of Systems   Constitution: Positive for weakness. Negative for malaise/fatigue.   HENT: Negative for congestion.    Eyes: Negative for blurred vision.   Cardiovascular: Positive for dyspnea on exertion and leg swelling. Negative for chest pain, claudication, cyanosis, irregular heartbeat, near-syncope, orthopnea, palpitations, paroxysmal nocturnal dyspnea and syncope.   Respiratory: Negative for shortness of breath.    Endocrine: Negative for polyuria.   Hematologic/Lymphatic: Negative for bleeding problem.   Skin: Negative for itching and rash.   Musculoskeletal: Positive for back pain, joint pain and joint swelling. Negative for muscle cramps and muscle weakness.   Gastrointestinal: Negative for abdominal pain, hematemesis, hematochezia, melena, nausea and vomiting.   Genitourinary: Negative for dysuria and hematuria.   Neurological: Negative for dizziness, focal weakness, headaches, light-headedness and loss of balance.   Psychiatric/Behavioral: Negative for " depression. The patient is not nervous/anxious.         Objective:    Physical Exam   Constitutional: He is oriented to person, place, and time. He appears well-developed and well-nourished.   HENT:   Head: Normocephalic and atraumatic.   Neck: Neck supple. No JVD present.   Cardiovascular: Normal rate, regular rhythm and normal heart sounds.    Pulses:       Carotid pulses are 2+ on the right side, and 2+ on the left side.       Radial pulses are 2+ on the right side, and 2+ on the left side.        Femoral pulses are 2+ on the right side, and 2+ on the left side.       Dorsalis pedis pulses are 2+ on the right side, and 2+ on the left side.        Posterior tibial pulses are 2+ on the right side, and 2+ on the left side.   Pulmonary/Chest: Effort normal and breath sounds normal.   Abdominal: Soft. Bowel sounds are normal.   Musculoskeletal: He exhibits edema.   Neurological: He is alert and oriented to person, place, and time.   Skin: Skin is warm and dry.   Psychiatric: He has a normal mood and affect. His behavior is normal. Thought content normal.         Assessment:       1. PAD (peripheral artery disease)    2. Hyperlipidemia, unspecified hyperlipidemia type    3. Essential hypertension    4. Diabetes mellitus type 2 in obese      68 y/o male with hx and presentation as above. Doing well from a PAD perspective with no signs of CLI or non healing ulceration and with Fer I symptoms. Current recommendation is for ASA/statin. He was counseled to walk regularly and continue losing weight.        Plan:       -Continue current medical management  -f/u in 1 year

## 2017-11-10 ENCOUNTER — OFFICE VISIT (OUTPATIENT)
Dept: PODIATRY | Facility: CLINIC | Age: 67
End: 2017-11-10
Payer: MEDICARE

## 2017-11-10 VITALS
SYSTOLIC BLOOD PRESSURE: 144 MMHG | WEIGHT: 315 LBS | DIASTOLIC BLOOD PRESSURE: 84 MMHG | HEART RATE: 74 BPM | HEIGHT: 72 IN | BODY MASS INDEX: 42.66 KG/M2

## 2017-11-10 DIAGNOSIS — B35.1 ONYCHOMYCOSIS: ICD-10-CM

## 2017-11-10 DIAGNOSIS — M79.672 FOOT PAIN, BILATERAL: ICD-10-CM

## 2017-11-10 DIAGNOSIS — E11.42 TYPE 2 DIABETES MELLITUS WITH PERIPHERAL NEUROPATHY: Primary | ICD-10-CM

## 2017-11-10 DIAGNOSIS — E11.51 TYPE 2 DIABETES MELLITUS WITH PERIPHERAL CIRCULATORY DISORDER: ICD-10-CM

## 2017-11-10 DIAGNOSIS — M79.671 FOOT PAIN, BILATERAL: ICD-10-CM

## 2017-11-10 PROCEDURE — 11721 DEBRIDE NAIL 6 OR MORE: CPT | Mod: Q9,S$GLB,, | Performed by: PODIATRIST

## 2017-11-10 PROCEDURE — 99999 PR PBB SHADOW E&M-EST. PATIENT-LVL III: CPT | Mod: PBBFAC,,, | Performed by: PODIATRIST

## 2017-11-10 PROCEDURE — 99213 OFFICE O/P EST LOW 20 MIN: CPT | Mod: 25,S$GLB,, | Performed by: PODIATRIST

## 2017-11-10 NOTE — PROCEDURES
"Routine Foot Care  Date/Time: 11/10/2017 9:48 AM  Performed by: GABBY SHAY  Authorized by: GABBY SHAY     Time out: Immediately prior to procedure a "time out" was called to verify the correct patient, procedure, equipment, support staff and site/side marked as required.    Consent Done?:  Yes (Verbal)  Hyperkeratotic Skin Lesions?: No      Nail Care Type:  Debride  Location(s): All  (Left 1st Toe, Left 3rd Toe, Left 2nd Toe, Left 4th Toe, Left 5th Toe, Right 1st Toe, Right 2nd Toe, Right 3rd Toe, Right 4th Toe and Right 5th Toe)  Patient tolerance:  Patient tolerated the procedure well with no immediate complications      "

## 2017-11-12 NOTE — PROGRESS NOTES
Subjective:      Patient ID: Titi Banks is a 67 y.o. male.    Chief Complaint: Follow-up (Dr. Ruiz 11/8/17); Nail Care; and Diabetes Mellitus    Titi is a 67 y.o. male who presents to the clinic for evaluation and treatment of high risk feet. Titi has a past medical history of Arthritis; Cataract; Diabetes mellitus, type 2; Hyperlipidemia; Hypertension; PAD (peripheral artery disease); and Seizures. Complains of burning pain in both feet present mostly at night when lying down. Relates no significant back pain.    PCP: Dr. Jamel Ruiz  Date Last Seen by PCP: 11/8/17    Current shoe gear:  Affected Foot: Casual shoes     Unaffected Foot: Casual shoes    Hemoglobin A1C   Date Value Ref Range Status   02/12/2016 7.2 (H) 4.5 - 6.2 % Final   02/12/2016 7.2 (H) 4.5 - 6.2 % Final     Vitals:    11/10/17 0932   BP: (!) 144/84   Pulse: 74   Weight: (!) 206.4 kg (455 lb)   Height: 6' (1.829 m)   PainSc: 0-No pain      Past Medical History:   Diagnosis Date    Arthritis     Cataract     Diabetes mellitus, type 2     Hyperlipidemia     Hypertension     PAD (peripheral artery disease)     Seizures        Past Surgical History:   Procedure Laterality Date    EYE SURGERY      knee arthroscopy 10+ yrs ago Left 10+ years ago       Family History   Problem Relation Age of Onset    Hypertension Mother     Heart disease Mother     Cancer Father     Heart disease Sister     Diabetes Brother     Hypertension Brother     Heart disease Brother        Social History     Social History    Marital status:      Spouse name: N/A    Number of children: N/A    Years of education: N/A     Social History Main Topics    Smoking status: Never Smoker    Smokeless tobacco: None    Alcohol use No    Drug use: No    Sexual activity: Not Asked     Other Topics Concern    None     Social History Narrative    None       Current Outpatient Prescriptions   Medication Sig Dispense Refill    aspirin (ECOTRIN)  81 MG EC tablet Take 81 mg by mouth once daily.      b complex vitamins tablet Take 1 tablet by mouth once daily.      CA CARB/D3/MAG OX//KAYLENE/ZN (CALTRATE + D3 PLUS MINERALS ORAL) Take 1 tablet by mouth once daily.      glimepiride (AMARYL) 2 MG tablet Take 2 mg by mouth once daily.       glucosamine-chondroitin 500-400 mg tablet Take 1 tablet by mouth once daily.      ketoconazole (NIZORAL) 2 % cream APPLY TO AFFECTED AREA ON FEET TWICE A DAY 60 g 5    Lactobacillus rhamnosus GG (CULTURELLE) 10 billion cell capsule Take 1 capsule by mouth once daily.      losartan (COZAAR) 25 MG tablet Take 25 mg by mouth once daily.       metformin (GLUCOPHAGE-XR) 500 MG 24 hr tablet Pt. May Take 2 tablets by mouth in the morning, and 2 tablets by mouth in the evening or pt. Can take one tablet by mouth four times a day.      multivitamin capsule Take 1 capsule by mouth once daily.      multivitamin-minerals-lutein Tab Take 1 tablet by mouth once daily.      pravastatin (PRAVACHOL) 40 MG tablet Take 40 mg by mouth every evening.        No current facility-administered medications for this visit.        Review of patient's allergies indicates:   Allergen Reactions    Aspartame     Avandia [rosiglitazone]        Review of Systems   Constitution: Negative for chills and fever.   Cardiovascular: Positive for leg swelling. Negative for chest pain and claudication.   Respiratory: Negative for cough and shortness of breath.    Skin: Positive for dry skin, nail changes and poor wound healing.   Musculoskeletal: Negative.    Gastrointestinal: Negative for nausea and vomiting.   Neurological: Positive for numbness. Negative for paresthesias.   Psychiatric/Behavioral: Negative for altered mental status.           Objective:      Physical Exam   Constitutional: He is oriented to person, place, and time. No distress.   HENT:   Head: Normocephalic.   Cardiovascular: Intact distal pulses.    Pulses:       Dorsalis pedis pulses  are 1+ on the right side, and 1+ on the left side.        Posterior tibial pulses are 1+ on the right side, and 1+ on the left side.   CFT< 3 secs all toes bilateral foot, skin temp warm to cool bilateral foot, no digital hair growth bilateral lower extremity, moderate pitting lower extremity edema with hemosiderin staining and rubor on dependency bilateral.         Musculoskeletal:   Equinus noted b/l ankles. Hammertoe contractures noted to toes 2-3 b/l, semi-reducible. No pain to palpation b/l foot or toes. Cavus foot type bilateral.     Feet:   Right Foot:   Protective Sensation: 10 sites tested. 0 sites sensed.   Skin Integrity: Positive for dry skin. Negative for ulcer, blister, skin breakdown, erythema, warmth or callus.   Left Foot:   Protective Sensation: 10 sites tested. 0 sites sensed.   Skin Integrity: Positive for dry skin. Negative for ulcer (distal tip 3rd toe), blister (3rd toe), skin breakdown, erythema (3rd toe), warmth or callus.   Neurological: He is alert and oriented to person, place, and time. He has normal strength. A sensory deficit is present.   Diminished vibratory sensation b/l.    Skin: Skin is warm, dry and intact. Capillary refill takes 2 to 3 seconds. No ecchymosis and no rash noted. He is not diaphoretic. No cyanosis or erythema. Nails show no clubbing.   Nails 1-5 bilateral are elongated 2-3 mm, dystrophic, brittle with moderate debris and yellow discoloration.     Skin is dry and atrophied bilateral foot.    No open lesions or macerations bilateral lower extremity.           Vitals reviewed.        Assessment:       Encounter Diagnoses   Name Primary?    Type 2 diabetes mellitus with peripheral neuropathy Yes    Type 2 diabetes mellitus with peripheral circulatory disorder     Onychomycosis     Foot pain, bilateral          Plan:       Titi was seen today for follow-up, nail care and diabetes mellitus.    Diagnoses and all orders for this visit:    Type 2 diabetes mellitus  with peripheral neuropathy  -     Foot Care    Type 2 diabetes mellitus with peripheral circulatory disorder  -     Foot Care    Onychomycosis  -     Foot Care    Foot pain, bilateral      I counseled the patient on his conditions, their implications and medical management.    Discussed importance of tight glycemic control, daily foot checks, routine emolient therapy and wearing appropriate foot wear.    Routine foot care per attached note    Prescribed compound analgesic cream from [facility] to be applied as needed.

## 2018-02-20 ENCOUNTER — OFFICE VISIT (OUTPATIENT)
Dept: PODIATRY | Facility: CLINIC | Age: 68
End: 2018-02-20
Payer: MEDICARE

## 2018-02-20 VITALS
HEART RATE: 79 BPM | SYSTOLIC BLOOD PRESSURE: 176 MMHG | DIASTOLIC BLOOD PRESSURE: 89 MMHG | HEIGHT: 72 IN | WEIGHT: 315 LBS | BODY MASS INDEX: 42.66 KG/M2

## 2018-02-20 DIAGNOSIS — E11.51 TYPE 2 DIABETES MELLITUS WITH PERIPHERAL CIRCULATORY DISORDER: ICD-10-CM

## 2018-02-20 DIAGNOSIS — B35.1 ONYCHOMYCOSIS: ICD-10-CM

## 2018-02-20 DIAGNOSIS — E11.42 TYPE 2 DIABETES MELLITUS WITH PERIPHERAL NEUROPATHY: Primary | ICD-10-CM

## 2018-02-20 DIAGNOSIS — S91.109A OPEN WOUND OF TOE, INITIAL ENCOUNTER: ICD-10-CM

## 2018-02-20 PROCEDURE — 99213 OFFICE O/P EST LOW 20 MIN: CPT | Mod: 25,S$GLB,, | Performed by: PODIATRIST

## 2018-02-20 PROCEDURE — 11721 DEBRIDE NAIL 6 OR MORE: CPT | Mod: Q9,S$GLB,, | Performed by: PODIATRIST

## 2018-02-20 PROCEDURE — 87070 CULTURE OTHR SPECIMN AEROBIC: CPT

## 2018-02-20 PROCEDURE — 99999 PR PBB SHADOW E&M-EST. PATIENT-LVL III: CPT | Mod: PBBFAC,,, | Performed by: PODIATRIST

## 2018-02-20 PROCEDURE — 3008F BODY MASS INDEX DOCD: CPT | Mod: S$GLB,,, | Performed by: PODIATRIST

## 2018-02-20 PROCEDURE — 87186 SC STD MICRODIL/AGAR DIL: CPT | Mod: 59

## 2018-02-20 PROCEDURE — 87077 CULTURE AEROBIC IDENTIFY: CPT | Mod: 59

## 2018-02-20 RX ORDER — GENTAMICIN SULFATE 1 MG/G
CREAM TOPICAL 2 TIMES DAILY
Qty: 30 G | Refills: 0 | Status: SHIPPED | OUTPATIENT
Start: 2018-02-20

## 2018-02-20 NOTE — PATIENT INSTRUCTIONS
Apply gentamicin cream to wound left great toe until healed within 2-4 weeks then resume using ketoconazole cream    Recommend Urea 40% cream bought on the internet. Apply to foot twice a day and avoid use between toes.

## 2018-02-20 NOTE — PROCEDURES
"Routine Foot Care  Date/Time: 2/20/2018 9:42 AM  Performed by: GABBY SHAY  Authorized by: GABBY SHAY     Time out: Immediately prior to procedure a "time out" was called to verify the correct patient, procedure, equipment, support staff and site/side marked as required.    Consent Done?:  Yes (Verbal)  Hyperkeratotic Skin Lesions?: No      Nail Care Type:  Debride  Location(s): All  (Left 1st Toe, Left 3rd Toe, Left 2nd Toe, Left 4th Toe, Left 5th Toe, Right 1st Toe, Right 2nd Toe, Right 3rd Toe, Right 4th Toe and Right 5th Toe)  Patient tolerance:  Patient tolerated the procedure well with no immediate complications      "

## 2018-02-21 NOTE — PROGRESS NOTES
Subjective:      Patient ID: Titi Banks is a 67 y.o. male.    Chief Complaint: Diabetes Mellitus (Dr. Ruiz 1/3/18) and Nail Care    Titi is a 67 y.o. male who presents to the clinic for evaluation and treatment of high risk feet. Titi has a past medical history of Arthritis; Cataract; Diabetes mellitus, type 2; Hyperlipidemia; Hypertension; PAD (peripheral artery disease); and Seizures. Complains of burning pain in both feet present mostly at night when lying down. Relates no significant back pain.    PCP: Dr. Jamel Ruiz  Date Last Seen by PCP: 1/3/18    Current shoe gear:  Affected Foot: Casual shoes     Unaffected Foot: Casual shoes    Hemoglobin A1C   Date Value Ref Range Status   02/12/2016 7.2 (H) 4.5 - 6.2 % Final   02/12/2016 7.2 (H) 4.5 - 6.2 % Final     Vitals:    02/20/18 0915   BP: (!) 176/89   Pulse: 79   Weight: (!) 206.4 kg (455 lb)   Height: 6' (1.829 m)   PainSc: 0-No pain      Past Medical History:   Diagnosis Date    Arthritis     Cataract     Diabetes mellitus, type 2     Hyperlipidemia     Hypertension     PAD (peripheral artery disease)     Seizures        Past Surgical History:   Procedure Laterality Date    EYE SURGERY      knee arthroscopy 10+ yrs ago Left 10+ years ago       Family History   Problem Relation Age of Onset    Hypertension Mother     Heart disease Mother     Cancer Father     Heart disease Sister     Diabetes Brother     Hypertension Brother     Heart disease Brother        Social History     Social History    Marital status:      Spouse name: N/A    Number of children: N/A    Years of education: N/A     Social History Main Topics    Smoking status: Never Smoker    Smokeless tobacco: None    Alcohol use No    Drug use: No    Sexual activity: Not Asked     Other Topics Concern    None     Social History Narrative    None       Current Outpatient Prescriptions   Medication Sig Dispense Refill    aspirin (ECOTRIN) 81 MG EC  tablet Take 81 mg by mouth once daily.      b complex vitamins tablet Take 1 tablet by mouth once daily.      CA CARB/D3/MAG OX//KAYLENE/ZN (CALTRATE + D3 PLUS MINERALS ORAL) Take 1 tablet by mouth once daily.      glimepiride (AMARYL) 2 MG tablet Take 2 mg by mouth once daily.       glucosamine-chondroitin 500-400 mg tablet Take 1 tablet by mouth once daily.      ketoconazole (NIZORAL) 2 % cream APPLY TO AFFECTED AREA ON FEET TWICE A DAY 60 g 5    Lactobacillus rhamnosus GG (CULTURELLE) 10 billion cell capsule Take 1 capsule by mouth once daily.      losartan (COZAAR) 25 MG tablet Take 25 mg by mouth once daily.       metformin (GLUCOPHAGE-XR) 500 MG 24 hr tablet Pt. May Take 2 tablets by mouth in the morning, and 2 tablets by mouth in the evening or pt. Can take one tablet by mouth four times a day.      multivitamin capsule Take 1 capsule by mouth once daily.      multivitamin-minerals-lutein Tab Take 1 tablet by mouth once daily.      pravastatin (PRAVACHOL) 40 MG tablet Take 40 mg by mouth every evening.       gentamicin (GARAMYCIN) 0.1 % cream Apply topically 2 (two) times daily. Apply to wound left great toe. 30 g 0     No current facility-administered medications for this visit.        Review of patient's allergies indicates:   Allergen Reactions    Aspartame     Avandia [rosiglitazone]        Review of Systems   Constitution: Negative for chills and fever.   Cardiovascular: Positive for leg swelling. Negative for chest pain and claudication.   Respiratory: Negative for cough and shortness of breath.    Skin: Positive for dry skin, nail changes and poor wound healing.   Musculoskeletal: Negative.    Gastrointestinal: Negative for nausea and vomiting.   Neurological: Positive for numbness. Negative for paresthesias.   Psychiatric/Behavioral: Negative for altered mental status.           Objective:      Physical Exam   Constitutional: He is oriented to person, place, and time. No distress.    HENT:   Head: Normocephalic.   Cardiovascular: Intact distal pulses.    Pulses:       Dorsalis pedis pulses are 1+ on the right side, and 1+ on the left side.        Posterior tibial pulses are 1+ on the right side, and 1+ on the left side.   CFT< 3 secs all toes bilateral foot, skin temp warm to cool bilateral foot, no digital hair growth bilateral lower extremity, moderate pitting lower extremity edema with hemosiderin staining and rubor on dependency bilateral.         Musculoskeletal:   Equinus noted b/l ankles. Hammertoe contractures noted to toes 2-3 b/l, semi-reducible. No pain to palpation b/l foot or toes. Cavus foot type bilateral.     Feet:   Right Foot:   Protective Sensation: 10 sites tested. 0 sites sensed.   Skin Integrity: Positive for dry skin. Negative for ulcer, blister, skin breakdown, erythema, warmth or callus.   Left Foot:   Protective Sensation: 10 sites tested. 0 sites sensed.   Skin Integrity: Positive for dry skin. Negative for ulcer (distal tip 3rd toe), blister (3rd toe), skin breakdown, erythema (3rd toe), warmth or callus.   Neurological: He is alert and oriented to person, place, and time. He has normal strength. A sensory deficit is present.   Diminished vibratory sensation b/l.    Skin: Skin is warm, dry and intact. Capillary refill takes 2 to 3 seconds. No ecchymosis and no rash noted. He is not diaphoretic. No cyanosis or erythema. Nails show no clubbing.   Nails 1-5 bilateral are elongated 2-3 mm, dystrophic, brittle with moderate debris and yellow discoloration.     Skin is dry and atrophied bilateral foot.    No open lesions or macerations bilateral lower extremity.           Vitals reviewed.        Assessment:       Encounter Diagnoses   Name Primary?    Type 2 diabetes mellitus with peripheral neuropathy Yes    Type 2 diabetes mellitus with peripheral circulatory disorder     Onychomycosis     Open wound of toe, initial encounter          Plan:       Titi was seen  today for diabetes mellitus and nail care.    Diagnoses and all orders for this visit:    Type 2 diabetes mellitus with peripheral neuropathy  -     Foot Care    Type 2 diabetes mellitus with peripheral circulatory disorder  -     Foot Care    Onychomycosis  -     Foot Care    Open wound of toe, initial encounter  -     Aerobic culture (Specify Source)    Other orders  -     gentamicin (GARAMYCIN) 0.1 % cream; Apply topically 2 (two) times daily. Apply to wound left great toe.      I counseled the patient on his conditions, their implications and medical management.    Discussed importance of tight glycemic control, daily foot checks, routine emolient therapy and wearing appropriate foot wear.    Routine foot care per attached note. Left hallux nail bed with superficial granular wound and green staining of surrounding skin, + odor, no active drainage, does not probe or track. Suspect pseudomonas therefore reviewed appropriate use of gentamicin cream.    Continue with compound analgesic cream from [facility] to be applied as needed.    Instructions for patient:  Apply gentamicin cream to wound left great toe until healed within 2-4 weeks then resume using ketoconazole cream    Recommend Urea 40% cream bought on the internet. Apply to foot twice a day and avoid use between toes.    RTC 3 months or prn.

## 2018-02-23 LAB
BACTERIA SPEC AEROBE CULT: NORMAL

## 2018-02-26 DIAGNOSIS — E11.42 TYPE 2 DIABETES MELLITUS WITH PERIPHERAL NEUROPATHY: ICD-10-CM

## 2018-02-26 DIAGNOSIS — I87.2 VENOUS INSUFFICIENCY OF BOTH LOWER EXTREMITIES: ICD-10-CM

## 2018-02-26 RX ORDER — KETOCONAZOLE 20 MG/G
CREAM TOPICAL
Qty: 60 G | Refills: 5 | Status: SHIPPED | OUTPATIENT
Start: 2018-02-26 | End: 2018-07-05 | Stop reason: SDUPTHER

## 2018-07-05 ENCOUNTER — OFFICE VISIT (OUTPATIENT)
Dept: PODIATRY | Facility: CLINIC | Age: 68
End: 2018-07-05
Payer: MEDICARE

## 2018-07-05 VITALS
BODY MASS INDEX: 36.45 KG/M2 | HEIGHT: 78 IN | DIASTOLIC BLOOD PRESSURE: 83 MMHG | SYSTOLIC BLOOD PRESSURE: 140 MMHG | WEIGHT: 315 LBS | HEART RATE: 66 BPM

## 2018-07-05 DIAGNOSIS — I87.2 VENOUS INSUFFICIENCY OF BOTH LOWER EXTREMITIES: ICD-10-CM

## 2018-07-05 DIAGNOSIS — B35.3 TINEA PEDIS OF BOTH FEET: ICD-10-CM

## 2018-07-05 DIAGNOSIS — E11.42 TYPE 2 DIABETES MELLITUS WITH PERIPHERAL NEUROPATHY: Primary | ICD-10-CM

## 2018-07-05 DIAGNOSIS — B35.1 ONYCHOMYCOSIS: ICD-10-CM

## 2018-07-05 DIAGNOSIS — E11.51 TYPE 2 DIABETES MELLITUS WITH PERIPHERAL CIRCULATORY DISORDER: ICD-10-CM

## 2018-07-05 PROCEDURE — 3079F DIAST BP 80-89 MM HG: CPT | Mod: CPTII,S$GLB,, | Performed by: PODIATRIST

## 2018-07-05 PROCEDURE — 3077F SYST BP >= 140 MM HG: CPT | Mod: CPTII,S$GLB,, | Performed by: PODIATRIST

## 2018-07-05 PROCEDURE — 11721 DEBRIDE NAIL 6 OR MORE: CPT | Mod: Q9,S$GLB,, | Performed by: PODIATRIST

## 2018-07-05 PROCEDURE — 99999 PR PBB SHADOW E&M-EST. PATIENT-LVL III: CPT | Mod: PBBFAC,,, | Performed by: PODIATRIST

## 2018-07-05 PROCEDURE — 99213 OFFICE O/P EST LOW 20 MIN: CPT | Mod: 25,S$GLB,, | Performed by: PODIATRIST

## 2018-07-05 RX ORDER — KETOCONAZOLE 20 MG/G
CREAM TOPICAL
Qty: 60 G | Refills: 5 | Status: SHIPPED | OUTPATIENT
Start: 2018-07-05

## 2018-07-05 NOTE — PROCEDURES
"Routine Foot Care  Date/Time: 7/5/2018 3:55 PM  Performed by: GABBY SHAY  Authorized by: GABBY SHAY     Time out: Immediately prior to procedure a "time out" was called to verify the correct patient, procedure, equipment, support staff and site/side marked as required.    Consent Done?:  Yes (Verbal)  Hyperkeratotic Skin Lesions?: No      Nail Care Type:  Debride  Location(s): All  (Left 1st Toe, Left 3rd Toe, Left 2nd Toe, Left 4th Toe, Left 5th Toe, Right 1st Toe, Right 2nd Toe, Right 3rd Toe, Right 4th Toe and Right 5th Toe)  Patient tolerance:  Patient tolerated the procedure well with no immediate complications      "

## 2018-07-05 NOTE — PATIENT INSTRUCTIONS
Use cerave lotion on the legs twice daily.    Mix urea 40% cream and the ketoconazole 2% cream 1:1 and apply to feet and toenails twice daily. Avoid use between toes.

## 2018-07-06 NOTE — PROGRESS NOTES
"Subjective:      Patient ID: Titi Banks is a 68 y.o. male.    Chief Complaint: Diabetic Foot Exam (Dr. Ruiz 4/13/18)    Titi is a 68 y.o. male who presents to the clinic for evaluation and treatment of high risk feet. Titi has a past medical history of Arthritis; Cataract; Diabetes mellitus, type 2; Hyperlipidemia; Hypertension; PAD (peripheral artery disease); and Seizures. Complains of burning pain in both feet present mostly at night when lying down. Relates no significant back pain. No new complaints. Accompanied by his wife. He is using urea 40% cream but says it leaves a thick white film.    PCP: Dr. Jamel Ruiz  Date Last Seen by PCP: 4/13/18    Current shoe gear:  Affected Foot: Casual shoes     Unaffected Foot: Casual shoes    Hemoglobin A1C   Date Value Ref Range Status   02/12/2016 7.2 (H) 4.5 - 6.2 % Final   02/12/2016 7.2 (H) 4.5 - 6.2 % Final     Vitals:    07/05/18 1521   BP: (!) 140/83   Pulse: 66   Weight: (!) 206.4 kg (455 lb)   Height: 6' 9" (2.057 m)   PainSc: 0-No pain      Past Medical History:   Diagnosis Date    Arthritis     Cataract     Diabetes mellitus, type 2     Hyperlipidemia     Hypertension     PAD (peripheral artery disease)     Seizures        Past Surgical History:   Procedure Laterality Date    EYE SURGERY      knee arthroscopy 10+ yrs ago Left 10+ years ago       Family History   Problem Relation Age of Onset    Hypertension Mother     Heart disease Mother     Cancer Father     Heart disease Sister     Diabetes Brother     Hypertension Brother     Heart disease Brother        Social History     Social History    Marital status:      Spouse name: N/A    Number of children: N/A    Years of education: N/A     Social History Main Topics    Smoking status: Never Smoker    Smokeless tobacco: None    Alcohol use No    Drug use: No    Sexual activity: Not Asked     Other Topics Concern    None     Social History Narrative    None "       Current Outpatient Prescriptions   Medication Sig Dispense Refill    aspirin (ECOTRIN) 81 MG EC tablet Take 81 mg by mouth once daily.      b complex vitamins tablet Take 1 tablet by mouth once daily.      CA CARB/D3/MAG OX//KAYLENE/ZN (CALTRATE + D3 PLUS MINERALS ORAL) Take 1 tablet by mouth once daily.      gentamicin (GARAMYCIN) 0.1 % cream Apply topically 2 (two) times daily. Apply to wound left great toe. 30 g 0    glimepiride (AMARYL) 2 MG tablet Take 2 mg by mouth once daily.       glucosamine-chondroitin 500-400 mg tablet Take 1 tablet by mouth once daily.      ketoconazole (NIZORAL) 2 % cream APPLY TO AFFECTED AREA ON FEET TWICE A DAY 60 g 5    Lactobacillus rhamnosus GG (CULTURELLE) 10 billion cell capsule Take 1 capsule by mouth once daily.      losartan (COZAAR) 25 MG tablet Take 25 mg by mouth once daily.       metformin (GLUCOPHAGE-XR) 500 MG 24 hr tablet Pt. May Take 2 tablets by mouth in the morning, and 2 tablets by mouth in the evening or pt. Can take one tablet by mouth four times a day.      multivitamin capsule Take 1 capsule by mouth once daily.      multivitamin-minerals-lutein Tab Take 1 tablet by mouth once daily.      pravastatin (PRAVACHOL) 40 MG tablet Take 40 mg by mouth every evening.        No current facility-administered medications for this visit.        Review of patient's allergies indicates:   Allergen Reactions    Aspartame     Avandia [rosiglitazone]        Review of Systems   Constitution: Negative for chills and fever.   Cardiovascular: Positive for leg swelling. Negative for chest pain and claudication.   Respiratory: Negative for cough and shortness of breath.    Skin: Positive for dry skin, nail changes and poor wound healing.   Musculoskeletal: Negative.    Gastrointestinal: Negative for nausea and vomiting.   Neurological: Positive for numbness. Negative for paresthesias.   Psychiatric/Behavioral: Negative for altered mental status.            Objective:      Physical Exam   Constitutional: He is oriented to person, place, and time. No distress.   HENT:   Head: Normocephalic.   Cardiovascular: Intact distal pulses.    Pulses:       Dorsalis pedis pulses are 1+ on the right side, and 1+ on the left side.        Posterior tibial pulses are 1+ on the right side, and 1+ on the left side.   CFT< 3 secs all toes bilateral foot, skin temp warm to cool bilateral foot, no digital hair growth bilateral lower extremity, moderate pitting lower extremity edema with hemosiderin staining and rubor on dependency bilateral.         Musculoskeletal:   Equinus noted b/l ankles. Hammertoe contractures noted to toes 2-3 b/l, semi-reducible. No pain to palpation b/l foot or toes. Cavus foot type bilateral.     Feet:   Right Foot:   Protective Sensation: 10 sites tested. 0 sites sensed.   Skin Integrity: Positive for dry skin. Negative for ulcer, blister, skin breakdown, erythema, warmth or callus.   Left Foot:   Protective Sensation: 10 sites tested. 0 sites sensed.   Skin Integrity: Positive for dry skin. Negative for ulcer (distal tip 3rd toe), blister (3rd toe), skin breakdown, erythema (3rd toe), warmth or callus.   Neurological: He is alert and oriented to person, place, and time. He has normal strength. A sensory deficit is present.   Diminished vibratory sensation b/l.    Skin: Skin is warm, dry and intact. Capillary refill takes 2 to 3 seconds. No ecchymosis and no rash noted. He is not diaphoretic. No cyanosis or erythema. Nails show no clubbing.   Nails 1-5 bilateral are elongated 2-3 mm, dystrophic, brittle with moderate debris and yellow discoloration.     Skin is dry and atrophied bilateral foot.    No open lesions or macerations bilateral lower extremity.    Scaling and erosions noted along plantar left foot with moderate dryness.           Vitals reviewed.        Assessment:       Encounter Diagnoses   Name Primary?    Type 2 diabetes mellitus with peripheral  neuropathy Yes    Type 2 diabetes mellitus with peripheral circulatory disorder     Tinea pedis of both feet     Onychomycosis     Venous insufficiency of both lower extremities          Plan:       Titi was seen today for diabetic foot exam.    Diagnoses and all orders for this visit:    Type 2 diabetes mellitus with peripheral neuropathy  -     ketoconazole (NIZORAL) 2 % cream; APPLY TO AFFECTED AREA ON FEET TWICE A DAY  -     Foot Care    Type 2 diabetes mellitus with peripheral circulatory disorder  -     Foot Care    Tinea pedis of both feet    Onychomycosis  -     Foot Care    Venous insufficiency of both lower extremities  -     ketoconazole (NIZORAL) 2 % cream; APPLY TO AFFECTED AREA ON FEET TWICE A DAY      I counseled the patient on his conditions, their implications and medical management.    Discussed importance of tight glycemic control, daily foot checks, routine emolient therapy and wearing appropriate foot wear.    Routine foot care per attached note.     Continue with compound analgesic cream from [facility] to be applied as needed.    Instructions for patient:  Use cerave lotion on the legs twice daily.    Mix urea 40% cream and the ketoconazole 2% cream 1:1 and apply to feet and toenails twice daily. Avoid use between toes.    RTC 3 months or prn.

## 2020-10-27 ENCOUNTER — OFFICE VISIT (OUTPATIENT)
Dept: URGENT CARE | Facility: CLINIC | Age: 70
End: 2020-10-27
Payer: MEDICARE

## 2020-10-27 VITALS
TEMPERATURE: 98 F | DIASTOLIC BLOOD PRESSURE: 84 MMHG | SYSTOLIC BLOOD PRESSURE: 142 MMHG | HEIGHT: 78 IN | WEIGHT: 315 LBS | OXYGEN SATURATION: 97 % | BODY MASS INDEX: 36.45 KG/M2 | RESPIRATION RATE: 19 BRPM | HEART RATE: 81 BPM

## 2020-10-27 DIAGNOSIS — L03.115 CELLULITIS OF RIGHT LOWER LEG: Primary | ICD-10-CM

## 2020-10-27 PROCEDURE — 96372 THER/PROPH/DIAG INJ SC/IM: CPT | Mod: S$GLB,,, | Performed by: FAMILY MEDICINE

## 2020-10-27 PROCEDURE — 99214 PR OFFICE/OUTPT VISIT, EST, LEVL IV, 30-39 MIN: ICD-10-PCS | Mod: 25,S$GLB,, | Performed by: FAMILY MEDICINE

## 2020-10-27 PROCEDURE — 99214 OFFICE O/P EST MOD 30 MIN: CPT | Mod: 25,S$GLB,, | Performed by: FAMILY MEDICINE

## 2020-10-27 PROCEDURE — 96372 PR INJECTION,THERAP/PROPH/DIAG2ST, IM OR SUBCUT: ICD-10-PCS | Mod: S$GLB,,, | Performed by: FAMILY MEDICINE

## 2020-10-27 RX ORDER — CEFTRIAXONE 1 G/1
1 INJECTION, POWDER, FOR SOLUTION INTRAMUSCULAR; INTRAVENOUS
Status: COMPLETED | OUTPATIENT
Start: 2020-10-27 | End: 2020-10-27

## 2020-10-27 RX ORDER — SULFAMETHOXAZOLE AND TRIMETHOPRIM 800; 160 MG/1; MG/1
1 TABLET ORAL 2 TIMES DAILY
Qty: 20 TABLET | Refills: 0 | Status: SHIPPED | OUTPATIENT
Start: 2020-10-27

## 2020-10-27 RX ADMIN — CEFTRIAXONE 1 G: 1 INJECTION, POWDER, FOR SOLUTION INTRAMUSCULAR; INTRAVENOUS at 07:10

## 2020-10-27 NOTE — PROGRESS NOTES
"Subjective:       Patient ID: Titi Banks is a 70 y.o. male.    Vitals:  height is 6' 9" (2.057 m) and weight is 213.2 kg (470 lb) (abnormal). His temperature is 98.3 °F (36.8 °C). His blood pressure is 142/84 (abnormal) and his pulse is 81. His respiration is 19 and oxygen saturation is 97%.     Chief Complaint: Ankle Pain    Patient presents with right ankle foot pain redness and swelling that started today.     Ankle Pain   The incident occurred 6 to 12 hours ago. The incident occurred at home. There was no injury mechanism. The pain is present in the right ankle and right foot. The quality of the pain is described as aching. The pain is at a severity of 5/10. The pain is moderate. The pain has been intermittent since onset. Associated symptoms include an inability to bear weight. He reports no foreign bodies present. The symptoms are aggravated by movement, palpation and weight bearing. He has tried nothing for the symptoms. The treatment provided no relief.       Constitution: Negative for chills, fatigue and fever.   HENT: Negative for congestion and sore throat.    Neck: Negative for painful lymph nodes.   Cardiovascular: Negative for chest pain and leg swelling.   Eyes: Negative for double vision and blurred vision.   Respiratory: Negative for cough and shortness of breath.    Gastrointestinal: Negative for nausea, vomiting and diarrhea.   Genitourinary: Negative for dysuria, frequency and urgency.   Musculoskeletal: Positive for pain, joint pain, abnormal ROM of joint and muscle ache. Negative for joint swelling and muscle cramps.   Skin: Positive for color change and erythema (to mid calf right leg). Negative for pale and rash.   Allergic/Immunologic: Negative for seasonal allergies.   Neurological: Negative for dizziness, history of vertigo, light-headedness, passing out and headaches.   Hematologic/Lymphatic: Negative for swollen lymph nodes, easy bruising/bleeding and history of blood clots. Does " not bruise/bleed easily.   Psychiatric/Behavioral: Negative for nervous/anxious, sleep disturbance and depression. The patient is not nervous/anxious.        Objective:      Physical Exam   HENT:   Head: Normocephalic and atraumatic.   Neck: Normal range of motion. Neck supple.   Cardiovascular: Normal rate, regular rhythm, normal heart sounds and normal pulses.   Pulmonary/Chest: Effort normal and breath sounds normal.   Abdominal: Normal appearance.   Musculoskeletal:         General: Swelling (right ankle) present.      Right lower leg: Edema present.   Neurological: He is alert.   Skin: Skin is warm. Lesions:  erythema (to mid calf right leg)  Nursing note and vitals reviewed.        Assessment:       1. Cellulitis of right lower leg        Plan:         Cellulitis of right lower leg  -     Ambulatory referral/consult to Podiatry  -     cefTRIAXone injection 1 g  -     sulfamethoxazole-trimethoprim 800-160mg (BACTRIM DS) 800-160 mg Tab; Take 1 tablet by mouth 2 (two) times daily.  Dispense: 20 tablet; Refill: 0    elevation and heat. RTC prn worsening symptoms.

## 2021-11-18 NOTE — PATIENT INSTRUCTIONS
Cellulitis  Cellulitis is an infection of the deep layers of skin. A break in the skin, such as a cut or scratch, can let bacteria under the skin. If the bacteria get to deep layers of the skin, it can be serious. If not treated, cellulitis can get into the bloodstream and lymph nodes. The infection can then spread throughout the body. This causes serious illness.  Cellulitis causes the affected skin to become red, swollen, warm, and sore. The reddened areas have a visible border. An open sore may leak fluid (pus). You may have a fever, chills, and pain.  Cellulitis is treated with antibiotics taken for 7 to 10 days. An open sore may be cleaned and covered with cool wet gauze. Symptoms should get better 1 to 2 days after treatment is started. Make sure to take all the antibiotics for the full number of days until they are gone. Keep taking the medicine even if your symptoms go away.  Home care  Follow these tips:  · Limit the use of the part of your body with cellulitis.   · If the infection is on your leg, keep your leg raised while sitting. This will help to reduce swelling.  · Take all of the antibiotic medicine exactly as directed until it is gone. Do not miss any doses, especially during the first 7 days. Dont stop taking the medicine when your symptoms get better.  · Keep the affected area clean and dry.  · Wash your hands with soap and warm water before and after touching your skin. Anyone else who touches your skin should also wash his or her hands. Don't share towels.  Follow-up care  Follow up with your healthcare provider, or as advised. If your infection does not go away on the first antibiotic, your healthcare provider will prescribe a different one.  When to seek medical advice  Call your healthcare provider right away if any of these occur:  · Red areas that spread  · Swelling or pain that gets worse  · Fluid leaking from the skin (pus)  · Fever higher of 100.4º F (38.0º C) or higher after 2 days  on antibiotics  Date Last Reviewed: 9/1/2016  © 2551-8690 [employer]. [address] [place] [address], [place] [address]. All rights reserved. This information is not intended as a substitute for professional medical care. Always follow your healthcare professional's instructions.         Quality 130: Documentation Of Current Medications In The Medical Record: Current Medications Documented Quality 226: Preventive Care And Screening: Tobacco Use: Screening And Cessation Intervention: Patient screened for tobacco use and is an ex/non-smoker Detail Level: Detailed Quality 110: Preventive Care And Screening: Influenza Immunization: Influenza Immunization Ordered or Recommended, but not Administered due to system reason

## 2023-07-12 DIAGNOSIS — I42.9 CARDIOMYOPATHY, UNSPECIFIED TYPE: Primary | ICD-10-CM
